# Patient Record
Sex: FEMALE | Race: WHITE | Employment: OTHER | URBAN - METROPOLITAN AREA
[De-identification: names, ages, dates, MRNs, and addresses within clinical notes are randomized per-mention and may not be internally consistent; named-entity substitution may affect disease eponyms.]

---

## 2021-08-11 PROBLEM — J96.01 ACUTE HYPOXEMIC RESPIRATORY FAILURE (HCC): Status: ACTIVE | Noted: 2021-08-11

## 2021-08-11 PROBLEM — A41.9 SEPSIS (HCC): Status: ACTIVE | Noted: 2021-08-11

## 2021-08-11 PROBLEM — J18.9 COMMUNITY ACQUIRED PNEUMONIA: Status: ACTIVE | Noted: 2021-08-11

## 2023-01-22 ENCOUNTER — HOME HEALTH ADMISSION (OUTPATIENT)
Dept: HOME HEALTH SERVICES | Facility: HOME HEALTH | Age: 78
End: 2023-01-22
Payer: MEDICARE

## 2023-01-23 ENCOUNTER — HOME CARE VISIT (OUTPATIENT)
Dept: SCHEDULING | Facility: HOME HEALTH | Age: 78
End: 2023-01-23
Payer: MEDICARE

## 2023-01-23 ENCOUNTER — HOME CARE VISIT (OUTPATIENT)
Dept: HOME HEALTH SERVICES | Facility: HOME HEALTH | Age: 78
End: 2023-01-23
Payer: MEDICARE

## 2023-01-23 VITALS
OXYGEN SATURATION: 91 % | HEART RATE: 78 BPM | DIASTOLIC BLOOD PRESSURE: 40 MMHG | SYSTOLIC BLOOD PRESSURE: 60 MMHG | TEMPERATURE: 98.5 F | RESPIRATION RATE: 18 BRPM

## 2023-01-23 VITALS
HEART RATE: 66 BPM | RESPIRATION RATE: 20 BRPM | OXYGEN SATURATION: 95 % | DIASTOLIC BLOOD PRESSURE: 63 MMHG | SYSTOLIC BLOOD PRESSURE: 100 MMHG | TEMPERATURE: 96.5 F

## 2023-01-23 PROCEDURE — G0151 HHCP-SERV OF PT,EA 15 MIN: HCPCS

## 2023-01-23 PROCEDURE — G0495 RN CARE TRAIN/EDU IN HH: HCPCS

## 2023-01-23 NOTE — Clinical Note
admitted to home care for disease and medication teaching. frequency 2wk3, 1wk6. get parking pass after parking will tow evelinle.

## 2023-01-23 NOTE — Clinical Note
admitted to home care needed METOLAZONE 2.5 MG DAILY called in as prescription given at discharge pharmacy will not accept.  thank you

## 2023-01-23 NOTE — Clinical Note
Patient was seen for home care physical therapy assessment after her discharge from [facility]. rehab. Her blood pressure in sitting was 102/62. She then stood up and quickly became lightheaded, dizzy, and with glassy eyes. Took her pressure quickly and it was 60/40. Returned her to a sitting position and her demeanor returned to normal. Advised her  and the patient of the danger of the very low blood pressure and to use the wheelchair for mobility in the home until therapy can assist her with acclimating to standing mobility. She and her  expressed understanding. Will continue to monitor her blood pressure closely.

## 2023-01-24 NOTE — HOME HEALTH
Skilled services/Home bound verification:     Skilled Reason for admission/summary of clinical condition:Nonalcoholic Steatohepatitis, Afib, Pneumonia and medication teaching. This patient is homebound for the following reasons Requires considerable and taxing effort to leave the home , Requires the assistance of 1 or more persons to leave the home  and Only leaves the home for medical reasons or Denominational services and are infrequent and of short duration for other reasons . Caregiver: spouse. Caregiver assists with and need further training with ADLS/IADLS. TRANSPORTATION/MEAL PREP    Medications reconciled and all medications are not available in the home this visit. The following education was provided regarding medications:  reviewed all medication bottles in the home for purpose, side effects and frequency. VERBALIZED UNDERSTANDING BUT NEEDS REINFORCEMENT        Medications  are to early to assess at this time. High risk medication teaching regarding anticoagulants, hyperglycemic agents or opoid narcotics performed (specify) see interventions tab. Dr Park Reap notified of any discrepancies/look a like medications/medication interactions METOLAZONE 2.5 MG DAILY pharmacy will not accept prescription written at discharge. has appt in office on Wed. Home health supplies by type and quantity ordered/delivered this visit include: n/a    Patient education provided this visit to include: see interventions tab. .      Patient level of understanding of education provided:  see intervention tab for level of understanding. [facility] Education Provided: N/A  Patient response to procedure performed:   tolerated wound care without any complaints of pain.         Home exercise program/Homework provided: CONT ALL MEDICATIONS AS PRESCRIBED, DIET AS PRESCRIBED, IMPLEMENT FALL/INFECTION CONTROL/PRESSURE ULCER INTERVENTIONS,MONITOR FOR ABNORMAL S/SX AFIB/PNEUMONIA Marcus HERNANDEZ MD IMMEDIATELY, AND PERFORM DAILY WEIGHTS/VS.KEEP ALL FOLLOW UP APPTS AS PRESCRIBED. READ ALL TEACHING PACKETS FOR EDUCATION OF DISEASE PROCESS & TO PREVENT COMPLICATIONS. Pt/Caregiver instructed on plan of care and are agreeable to plan of care at this time. Physician Dr Orlando Pimentel  notified of patient admission to home health and plan of care including anticipated frequency of 2wk 3, 1wk6 and treatments/interventions/modalities of Nonalcoholic Steatohepatitis,. Discharge planning discussed with patient and caregiver. Discharge planning as follows: Will discharge when all  disease process, complication and dietary goals are met and understands all medication purpose/frequencies/side effects. Pt/Caregiver did verbalize understanding of discharge planning. Next MD appointment 1/25/2023 (date) with Dr Mari Pimentel MD/NP/PA. Patient/caregiver encouraged/instructed to keep appointment as lack of follow through with physician appointment could result in discontinuation of home care services for non-compliance.

## 2023-01-24 NOTE — HOME HEALTH
S: patient reports that she got pneumonia in August 2021 - they ran tests and found a \"fatty liver\" per  report. She was then diagnosed with cirrhosis of the liver - she was taking lactulose - she started progressing in November 2022 and she started getting regular pericentesis - they were getting more regular and closer together - then they started the albumin which patient reports has made a huge difference in her   patient reports that she is allowed 1.5 Liters of fluid per day   O: PAIN: patient gets \"gas\" which gives her discomfort - she had that last night but no pain currently   ROM: B LE hip flexion, abduction and adduction WFL   B knee flexion, extension WFL   STRENGTH: R knee ext 4-/5, R knee flexion 4-/5, R hip flex 3+/5, R hip abd/adduction 4-/5  L knee flexion and extension 4-/5, L hip flexion 3+/5 abduction and adduction 4-/5  BED MOBILITY:unable to assess due to inability to tolerate transfers due to orthostatic hypotension  EQUIPMENT: FWW, quad cane, reacher, transport wheechair, rollator (they are ordering a chair for the shower)  TRANSFERS: patient was able to complete sit to stand from the recliner chair with B UE push off and CGA for safety - on initial stance she assisted with steadying herself with her LEs pressed agains the recliner chair - she immediately started getting lightheaded and dizzy - took her blood pressure and it was 60/40 at that time-she was swaying and her eyes were closing so sat her back down quickly with focus on deep breathing.  once sitting , she improved quickly with her demeanore, color and talking back to her normal   GAIT: unable to assess as she was not able to tolerated standing   STEPS: elevator access to the 11th floor where her condo it   BALANCE: unable to assess as she was not able to tolerate standing or walking due to orthostatic hypotension   reports that patient was walking last night without the walker and she slipped and went down on a knee - she reports no injuries   RESPONSE TO TREATMENT:patient demonstrated a positive outcome post treatment and reported no increased pain. Patient reported good understanding of the HEP and reports confidence in ability to complete the program as prescribed by PT  RESPONSE TO EDUCATION: patient was educated on: safety, need to drink more water (but NOT more tan 1.5 L as the MD has restricted her fluid intake to this amount) - in order to try to keep her BP higher when standing in preparation for walking.  expressed understanding and the need to measure all fluid she takes in   Patient expressed understanding of all education provided and was able to repeat back education, precautions, and HEP. A:ASSESSMENT AND PROGRESS TOWARD GOALS:  Patient demonstrated a positive result to therapy this date as evidenced by an understanding of her fluid restrictions but the need to drink enough (no more than 1.5 L  per day) to assist with upright positioning, no increased pain with transitions and testing and patient expressing an understanding of the rehab plan due to therapy verbal and written instructions. Goals established for increased independence in the home, safe mobility in the home, improvement in strength and ROM - all designed to reduce fall risk and progress toward independence. Patient will benefit from continued PT intervention to progress toward meeting all established goals    P:.continue with progression of mobility, ther ex for strength, ROM, provide education to patient and caregivers to maximize the recovery and reduce the fall risk to progress toward a return to independent function    PMH/Summary of clinical condition:margie Lo is a 69 y.o. female with a history of hypertension presenting to hospital with complaint of cough, shortness of breath, fever and myalgia. Patient says she started feeling bad yesterday. She has cough with white sputum. Also get short of breath on activity.     No current chest pain. Feeling very weak and tired. Work-up consistent with right lower lobe pneumonia. Patient was also hypotensive and tachycardic. Feeling extremely weak and lethargic. List of Comorbidities:      Anemia    Pneumonia    Constipation    Afib    Ortho Hypotension    Peripheral Edema\"     Medications review completed. no chnages since nursing admission earlier today   medications are effective at this time    social history/ caregivers:patient lives with her  in a one story condo on the 11th floor with elevator access. Her  is the main caregiver and assists with meals, medications, ADLs, mobility and transportation     Pt/Caregiver instructed on plan of care and are agreeable to plan of care at this time. Plan of care and admission to home health status called to attending France Zaldivar MD     Discharge planning discussed with patient and caregiver. Discharge planning as follows: DC to self and family under MD supervision when all goals are met or maximum potential is reached  Pt/Caregiver did verbalize understanding. Patient education provided this visit: safety, signs and symptoms of orthostatis hypotension, 1.5 L fluid restriction, use of ankle pumps;marching in sitting to assist with blood pressure    Home exercise program: ankle pumps, seated marching  only attempt to stand with  and walker - at the first sign of dizziness or lightheadedness she needs to sit down with deep breathing     Continued need for the following skills: MD referral for HHPT following recent hospital stay.  HHPT is medically necessary to address  dx and clinical findings: decreased strength, impaired gait,increased swelling, decreased bed mobility, decreased transfer status, decreased endurance, decreased balance and decreased safety, increased pain in order to improve functional mobility/quality of life, decrease burden of care, reduce risk for re-hospitalization, work towards patient's personal goals of return to PLOF w decrease risk for falls.

## 2023-01-25 ENCOUNTER — HOME CARE VISIT (OUTPATIENT)
Dept: HOME HEALTH SERVICES | Facility: HOME HEALTH | Age: 78
End: 2023-01-25
Payer: MEDICARE

## 2023-01-26 ENCOUNTER — HOME CARE VISIT (OUTPATIENT)
Dept: SCHEDULING | Facility: HOME HEALTH | Age: 78
End: 2023-01-26
Payer: MEDICARE

## 2023-01-26 PROCEDURE — G0157 HHC PT ASSISTANT EA 15: HCPCS

## 2023-01-27 ENCOUNTER — HOME CARE VISIT (OUTPATIENT)
Dept: HOME HEALTH SERVICES | Facility: HOME HEALTH | Age: 78
End: 2023-01-27
Payer: MEDICARE

## 2023-01-27 NOTE — HOME HEALTH
Received a text message that the patient was taken to the ER for being incoherant.  Email sent missed visit completed

## 2023-01-29 ENCOUNTER — HOME CARE VISIT (OUTPATIENT)
Dept: HOME HEALTH SERVICES | Facility: HOME HEALTH | Age: 78
End: 2023-01-29
Payer: MEDICARE

## 2023-02-02 VITALS
RESPIRATION RATE: 16 BRPM | DIASTOLIC BLOOD PRESSURE: 50 MMHG | TEMPERATURE: 98 F | SYSTOLIC BLOOD PRESSURE: 100 MMHG | HEART RATE: 54 BPM

## 2023-02-02 NOTE — CASE COMMUNICATION
Received a text from Patient's  that patient was sent to the ER at [facility]  early this am due to being incoherent. Missed visit for physical therapy.

## 2023-02-02 NOTE — HOME HEALTH
SUBJECTIVE:  reports he worries if he doesn't get awakened and patient gets up at night-discussed with  and patient about  pressure sensitive alarm that he can order to increase awareness and decrease fall risk. Maddy Mata CAREGIVER INVOLVEMENT/ASSISTANCE NEEDED FOR: for all aspects of care as needed- is very attentive  [address] ORDERED/DELIVERED THIS VISIT INCLUDE: None  OBJECTIVE:  See interventions. PATIENT RESPONSE TO TREATMENT:Fair-became dizzy with gait-resolved with sitting rest  PATIENT LEVEL OF UNDERSTANDING OF EDUCATION PROVIDED: Patient instructed with fall risk prevention, pain mgmt, progression of HEP  ASSESSMENT OF PROGRESS TOWARD GOALS: Gait training: with FWW and pulling transport chair to follow-patient reports she was starting to feel dizzy at 10' had patient sit down and perform deep breathing exercises and she quickly felt better close SBA. Good tolerance of sitting exercises and deep breathing. CONTINUED NEED FOR THE FOLLOWING SKILLS: Patient will be seen 2 x week for Physical Therapy to continue to work toward goals within POC and HHPT is medically necessary to address  dx and clinical findings: decreased ROM, decreased strength, impaired gait, decreased ability w stair negotiation, increased swelling, decreased bed mobility, decreased transfer status, decreased endurance, decreased balance and decreased safety, increased pain in order to improve functional mobility/quality of life, decrease burden of care, reduce risk for re-hospitalization, work towards patient's personal goals of return to PLOF w decrease risk for falls. PLAN FOR NEXT VISIT: Gait/transfer training, strengthening exercises  THE FOLLOWING DISCHARGE PLANNING WAS DISCUSSED WITH THE PATIENT/CAREGIVER: This is visit number 2  out of  11  planned visits.   NEXT MD APPT:NONA